# Patient Record
Sex: MALE | Race: OTHER | Employment: STUDENT | URBAN - METROPOLITAN AREA
[De-identification: names, ages, dates, MRNs, and addresses within clinical notes are randomized per-mention and may not be internally consistent; named-entity substitution may affect disease eponyms.]

---
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
## 2024-01-10 ENCOUNTER — HOSPITAL ENCOUNTER (EMERGENCY)
Facility: HOSPITAL | Age: 19
Discharge: HOME | End: 2024-01-10
Attending: EMERGENCY MEDICINE

## 2024-01-10 VITALS
SYSTOLIC BLOOD PRESSURE: 135 MMHG | DIASTOLIC BLOOD PRESSURE: 77 MMHG | BODY MASS INDEX: 23.19 KG/M2 | WEIGHT: 175 LBS | HEIGHT: 73 IN | HEART RATE: 57 BPM | TEMPERATURE: 98 F | RESPIRATION RATE: 18 BRPM | OXYGEN SATURATION: 98 %

## 2024-01-10 DIAGNOSIS — H60.502 ACUTE OTITIS EXTERNA OF LEFT EAR, UNSPECIFIED TYPE: ICD-10-CM

## 2024-01-10 DIAGNOSIS — H61.20 IMPACTED CERUMEN, UNSPECIFIED LATERALITY: Primary | ICD-10-CM

## 2024-01-10 PROCEDURE — 99283 EMERGENCY DEPT VISIT LOW MDM: CPT

## 2024-01-10 PROCEDURE — 69209 REMOVE IMPACTED EAR WAX UNI: CPT

## 2024-01-10 RX ORDER — AMOXICILLIN 875 MG/1
875 TABLET, COATED ORAL 2 TIMES DAILY
Qty: 14 TABLET | Refills: 0 | Status: SHIPPED | OUTPATIENT
Start: 2024-01-10 | End: 2024-01-17

## 2024-01-10 RX ORDER — NEOMYCIN SULFATE, POLYMYXIN B SULFATE, HYDROCORTISONE 3.5; 10000; 1 MG/ML; [USP'U]/ML; MG/ML
4 SOLUTION/ DROPS AURICULAR (OTIC) 4 TIMES DAILY
Qty: 10 ML | Refills: 0 | Status: SHIPPED | OUTPATIENT
Start: 2024-01-10 | End: 2024-01-20

## 2024-01-10 NOTE — ED INITIAL ASSESSMENT (HPI)
To ED with c/o left ear pain. Patient states he put a Q-tip in his left ear to remove wax and pushed wax further into his ear canal. Patient states he has difficulty hearing out of his left ear.

## 2024-01-10 NOTE — ED PROVIDER NOTES
Patient Seen in: [facility] Emergency Department    History     Chief Complaint   Patient presents with    Ear Problem Pain     Stated Complaint: L Ear Pain     HPI    19 yo M without PMH presenting with decreased hearing to left ear with concern for increased ear wax - utilizing cotton swab/q-tip in attempt to improve symptoms thogh same ongoing and now with pain for which evaluation sought. No drainage. No fevers/chills, no URI symptoms/cough, no water exposure.    Past Medical History:   Diagnosis Date    Ear infection     L ear infection       History reviewed. No pertinent surgical history.         Family History   Problem Relation Age of Onset    Hypertension Maternal Grandfather        Social History     Socioeconomic History    Marital status: Single   Tobacco Use    Smoking status: Never    Smokeless tobacco: Never   Substance and Sexual Activity    Alcohol use: No     Alcohol/week: 0.0 standard drinks of alcohol    Drug use: No       Review of Systems :  Constitutional: As per HPI  HENT: (+) left hearing change, ear pain.    Positive for stated complaint: L Ear Pain  Other systems are as noted in HPI.  Constitutional and vital signs reviewed.      All other systems reviewed and negative except as noted above.    PSFH elements reviewed from today and agreed except as otherwise stated in HPI.    Physical Exam     ED Triage Vitals [01/10/24 1400]   /69   Pulse 59   Resp 18   Temp 98.1 °F (36.7 °C)   Temp src    SpO2 97 %   O2 Device None (Room air)       Current:/69   Pulse 59   Temp 98.1 °F (36.7 °C)   Resp 18   Ht 185.4 cm (6' 1\")   Wt 79.4 kg   SpO2 97%   BMI 23.09 kg/m²         Physical Exam   Constitutional: No distress.   HEENT: MMM. BL cerumen impaction, left EAC erythematous with inferior purulence without mastoid tenderness and TM obstructed by cerumen - after irrigation, TM partially visualized with inferior erythema without obvious rupture.  Head: Normocephalic.   Eyes: No  injection. No photophobia.  Neck: Neck supple. No meningismus.  Pulmonary/Chest: Effort normal.   Musculoskeletal: No gross deformity.  Neurological: Alert.   Skin: Skin is warm.   Psychiatric: Cooperative.  Nursing note and vitals reviewed.        ED Course   Labs Reviewed - No data to display    ED Course as of 01/10/24 1714  ------------------------------------------------------------  Time: 01/10 1625  Comment: Left ear symptomatically improving after irrigation, TM partially visualized as noted.       MDM   DIFFERENTIAL DIAGNOSIS: After history and physical exam differential diagnosis includes but is not limited to cerumen, otitis media, otitis externa.    Pulse ox: 97%:Normal on RA, as interpreted by myself    Medical Decision Making  Evaluation for decreased left sided hearing with impaction noted and secondary otitis externa after attempted cerumen clearance with q-tip; TM eventually partially visualized and without obvious rupture though with inferior erythema - will discharge with otic/systemic antibiotics and ENT referral for followup as needed.        I was wearing at minimum a facemask and eye protection throughout this encounter with handwashing performed prior and after patient evaluation without personal hand/facial/oropharyngeal contact and gloves worn throughout encounter. See note and/or contact this provider for further PPE details.      Disposition and Plan     Clinical Impression:  1. Impacted cerumen, unspecified laterality    2. Acute otitis externa of left ear, unspecified type        Disposition:  Discharge    Follow-up:  Yair Lee MD  [facility] [address] [address] [phone]    Call  For ENT followup and re-evaluation.      Medications Prescribed:  Discharge Medication List as of 1/10/2024  4:30 PM        START taking these medications    Details   Neomycin-Polymyxin-HC 1 % Otic Solution Place 4 drops in ear(s) 4 (four) times daily for 10 days., Normal, Disp-10 mL,  R-0      amoxicillin 875 MG Oral Tab Take 1 tablet (875 mg total) by mouth 2 (two) times daily for 7 days., Normal, Disp-14 tablet, R-0

## (undated) NOTE — ED AVS SNAPSHOT
[facility] Emergency Department    [address]     [address] [place] [address]    Phone:  [phone]    Fax:  [phone]           Santi Fry   MRN: [id number]    Department:  [facility] Emergency Department   Date of Visit:  5/27 please call our  at [phone]. Si tiene problemas para programar nazanin francisco de seguimiento según lo indicado, llame al encargado de alona al [phone].     It is our goal to assure that you are completely satisfied with every aspect o doctor until you can check with your doctor. Please bring the medication list to your next doctor's appointment. Any imaging studies and labs completed today can be reviewed in your [employer] account.   You may have had testing done that requires us to co and ask to get set up for an insurance coverage that is in-network with [facility]ith[facility]. [facility]     Sign up for [facility] access for your child.   [facility] access allows you to view health information for your child from their recent   visit, vi

## (undated) NOTE — LETTER
Date & Time: 3/1/2019, 11:53 AM  Patient: Santi Fry  Encounter Provider(s):    On File, BRIAN SANCHEZ Attending  FRANK Corrales       To Whom It May Concern:    Santi Fry was seen and treated in our department on 3/1/2019.  He can return to [facility]

## (undated) NOTE — LETTER
Date & Time: 11/20/2018, 10:32 AM  Patient: Agnieszka Doctor  Encounter Provider(s):    Myriam Dior MD       To Whom It May Concern:    Agnieszka Doctor was seen and treated in our department on 11/20/2018.  He should not participate in gym/sports until

## (undated) NOTE — ED AVS SNAPSHOT
Rylan Navarrete   MRN: [id number]    Department:  [facility] Emergency Department   Date of Visit:  3/1/2019           Disclosure     Insurance plans vary and the physician(s) referred by the ER may not be covered by your plan.  Please contact y CARE PHYSICIAN AT ONCE OR RETURN IMMEDIATELY TO THE EMERGENCY DEPARTMENT. If you have been prescribed any medication(s), please fill your prescription right away and begin taking the medication(s) as directed.   If you believe that any of the medications

## (undated) NOTE — ED AVS SNAPSHOT
Patsey Landau   MRN: [id number]    Department:  [facility] Emergency Department   Date of Visit:  11/20/2018           Disclosure     Insurance plans vary and the physician(s) referred by the ER may not be covered by your plan.  Please contact CARE PHYSICIAN AT ONCE OR RETURN IMMEDIATELY TO THE EMERGENCY DEPARTMENT. If you have been prescribed any medication(s), please fill your prescription right away and begin taking the medication(s) as directed.   If you believe that any of the medications

## (undated) NOTE — ED AVS SNAPSHOT
[facility] Emergency Department    [address]     [address] [place] [address]    Phone:  [phone]    Fax:  [phone]           Darwin Sagastume   MRN: [id number]    Department:  [facility] Emergency Department   Date of Visit:  5/27 and Class Registration line at [phone] or find a doctor online by visiting [email or web address].    IF THERE IS ANY CHANGE OR WORSENING OF YOUR CONDITION, CALL YOUR PRIMARY CARE PHYSICIAN AT ONCE OR RETURN IMMEDIATELY TO [address] [place] [address].     If